# Patient Record
Sex: MALE | Race: WHITE | NOT HISPANIC OR LATINO | Employment: FULL TIME | URBAN - METROPOLITAN AREA
[De-identification: names, ages, dates, MRNs, and addresses within clinical notes are randomized per-mention and may not be internally consistent; named-entity substitution may affect disease eponyms.]

---

## 2023-05-01 DIAGNOSIS — J30.2 SEASONAL ALLERGIC RHINITIS, UNSPECIFIED TRIGGER: Primary | ICD-10-CM

## 2023-05-01 RX ORDER — FLUTICASONE PROPIONATE 50 MCG
2 SPRAY, SUSPENSION (ML) NASAL DAILY
COMMUNITY
End: 2023-05-01 | Stop reason: SDUPTHER

## 2023-05-01 RX ORDER — FLUTICASONE PROPIONATE 50 MCG
2 SPRAY, SUSPENSION (ML) NASAL DAILY
Qty: 16 G | Refills: 11 | Status: SHIPPED | OUTPATIENT
Start: 2023-05-01

## 2024-03-18 ENCOUNTER — OFFICE VISIT (OUTPATIENT)
Dept: PRIMARY CARE | Facility: CLINIC | Age: 24
End: 2024-03-18
Payer: COMMERCIAL

## 2024-03-18 ENCOUNTER — LAB (OUTPATIENT)
Dept: LAB | Facility: LAB | Age: 24
End: 2024-03-18
Payer: COMMERCIAL

## 2024-03-18 VITALS
HEIGHT: 70 IN | OXYGEN SATURATION: 96 % | BODY MASS INDEX: 26.05 KG/M2 | SYSTOLIC BLOOD PRESSURE: 128 MMHG | WEIGHT: 182 LBS | RESPIRATION RATE: 20 BRPM | HEART RATE: 73 BPM | TEMPERATURE: 98.2 F | DIASTOLIC BLOOD PRESSURE: 78 MMHG

## 2024-03-18 DIAGNOSIS — K59.09 CHRONIC CONSTIPATION: ICD-10-CM

## 2024-03-18 DIAGNOSIS — Z00.00 ROUTINE GENERAL MEDICAL EXAMINATION AT A HEALTH CARE FACILITY: ICD-10-CM

## 2024-03-18 DIAGNOSIS — R51.9 NONINTRACTABLE EPISODIC HEADACHE, UNSPECIFIED HEADACHE TYPE: ICD-10-CM

## 2024-03-18 DIAGNOSIS — Z00.00 ROUTINE GENERAL MEDICAL EXAMINATION AT A HEALTH CARE FACILITY: Primary | ICD-10-CM

## 2024-03-18 DIAGNOSIS — H61.23 BILATERAL IMPACTED CERUMEN: ICD-10-CM

## 2024-03-18 PROBLEM — S76.311A HAMSTRING STRAIN, RIGHT, INITIAL ENCOUNTER: Status: RESOLVED | Noted: 2024-03-18 | Resolved: 2024-03-18

## 2024-03-18 PROBLEM — R10.13 EPIGASTRIC ABDOMINAL PAIN: Status: RESOLVED | Noted: 2024-03-18 | Resolved: 2024-03-18

## 2024-03-18 PROBLEM — J06.9 UPPER RESPIRATORY INFECTION: Status: RESOLVED | Noted: 2024-03-18 | Resolved: 2024-03-18

## 2024-03-18 PROBLEM — S30.0XXA CONTUSION OF SACRUM: Status: RESOLVED | Noted: 2024-03-18 | Resolved: 2024-03-18

## 2024-03-18 PROBLEM — L73.9 FOLLICULITIS: Status: RESOLVED | Noted: 2024-03-18 | Resolved: 2024-03-18

## 2024-03-18 PROBLEM — J30.9 ALLERGIC RHINITIS: Status: ACTIVE | Noted: 2024-03-18

## 2024-03-18 PROBLEM — K21.9 GERD (GASTROESOPHAGEAL REFLUX DISEASE): Status: ACTIVE | Noted: 2024-03-18

## 2024-03-18 LAB
ALBUMIN SERPL BCP-MCNC: 4.8 G/DL (ref 3.4–5)
ALP SERPL-CCNC: 60 U/L (ref 33–120)
ALT SERPL W P-5'-P-CCNC: 33 U/L (ref 10–52)
ANION GAP SERPL CALC-SCNC: 11 MMOL/L (ref 10–20)
AST SERPL W P-5'-P-CCNC: 19 U/L (ref 9–39)
BASOPHILS # BLD AUTO: 0.05 X10*3/UL (ref 0–0.1)
BASOPHILS NFR BLD AUTO: 0.8 %
BILIRUB SERPL-MCNC: 0.8 MG/DL (ref 0–1.2)
BUN SERPL-MCNC: 14 MG/DL (ref 6–23)
CALCIUM SERPL-MCNC: 9.5 MG/DL (ref 8.6–10.3)
CHLORIDE SERPL-SCNC: 103 MMOL/L (ref 98–107)
CHOLEST SERPL-MCNC: 166 MG/DL (ref 0–199)
CHOLESTEROL/HDL RATIO: 4.2
CO2 SERPL-SCNC: 27 MMOL/L (ref 21–32)
CREAT SERPL-MCNC: 1.01 MG/DL (ref 0.5–1.3)
EGFRCR SERPLBLD CKD-EPI 2021: >90 ML/MIN/1.73M*2
EOSINOPHIL # BLD AUTO: 0.24 X10*3/UL (ref 0–0.7)
EOSINOPHIL NFR BLD AUTO: 3.7 %
ERYTHROCYTE [DISTWIDTH] IN BLOOD BY AUTOMATED COUNT: 12.4 % (ref 11.5–14.5)
GLUCOSE SERPL-MCNC: 98 MG/DL (ref 74–99)
HCT VFR BLD AUTO: 43.9 % (ref 41–52)
HDLC SERPL-MCNC: 39.1 MG/DL
HGB BLD-MCNC: 15.4 G/DL (ref 13.5–17.5)
IMM GRANULOCYTES # BLD AUTO: 0.01 X10*3/UL (ref 0–0.7)
IMM GRANULOCYTES NFR BLD AUTO: 0.2 % (ref 0–0.9)
LDLC SERPL CALC-MCNC: 104 MG/DL
LYMPHOCYTES # BLD AUTO: 1.62 X10*3/UL (ref 1.2–4.8)
LYMPHOCYTES NFR BLD AUTO: 25.2 %
MCH RBC QN AUTO: 30.6 PG (ref 26–34)
MCHC RBC AUTO-ENTMCNC: 35.1 G/DL (ref 32–36)
MCV RBC AUTO: 87 FL (ref 80–100)
MONOCYTES # BLD AUTO: 0.65 X10*3/UL (ref 0.1–1)
MONOCYTES NFR BLD AUTO: 10.1 %
NEUTROPHILS # BLD AUTO: 3.86 X10*3/UL (ref 1.2–7.7)
NEUTROPHILS NFR BLD AUTO: 60 %
NON HDL CHOLESTEROL: 127 MG/DL (ref 0–149)
NRBC BLD-RTO: 0 /100 WBCS (ref 0–0)
PLATELET # BLD AUTO: 287 X10*3/UL (ref 150–450)
POTASSIUM SERPL-SCNC: 3.8 MMOL/L (ref 3.5–5.3)
PROT SERPL-MCNC: 7.5 G/DL (ref 6.4–8.2)
RBC # BLD AUTO: 5.03 X10*6/UL (ref 4.5–5.9)
SODIUM SERPL-SCNC: 137 MMOL/L (ref 136–145)
TRIGL SERPL-MCNC: 117 MG/DL (ref 0–149)
VLDL: 23 MG/DL (ref 0–40)
WBC # BLD AUTO: 6.4 X10*3/UL (ref 4.4–11.3)

## 2024-03-18 PROCEDURE — 36415 COLL VENOUS BLD VENIPUNCTURE: CPT

## 2024-03-18 PROCEDURE — 99395 PREV VISIT EST AGE 18-39: CPT

## 2024-03-18 PROCEDURE — 80061 LIPID PANEL: CPT

## 2024-03-18 PROCEDURE — 1036F TOBACCO NON-USER: CPT

## 2024-03-18 PROCEDURE — 80053 COMPREHEN METABOLIC PANEL: CPT

## 2024-03-18 PROCEDURE — 85025 COMPLETE CBC W/AUTO DIFF WBC: CPT

## 2024-03-18 RX ORDER — CETIRIZINE HYDROCHLORIDE 10 MG/1
TABLET ORAL EVERY 24 HOURS
COMMUNITY
Start: 2020-03-26

## 2024-03-18 SDOH — ECONOMIC STABILITY: INCOME INSECURITY: IN THE LAST 12 MONTHS, WAS THERE A TIME WHEN YOU WERE NOT ABLE TO PAY THE MORTGAGE OR RENT ON TIME?: NO

## 2024-03-18 SDOH — ECONOMIC STABILITY: FOOD INSECURITY: WITHIN THE PAST 12 MONTHS, YOU WORRIED THAT YOUR FOOD WOULD RUN OUT BEFORE YOU GOT MONEY TO BUY MORE.: NEVER TRUE

## 2024-03-18 SDOH — ECONOMIC STABILITY: FOOD INSECURITY: WITHIN THE PAST 12 MONTHS, THE FOOD YOU BOUGHT JUST DIDN'T LAST AND YOU DIDN'T HAVE MONEY TO GET MORE.: NEVER TRUE

## 2024-03-18 SDOH — ECONOMIC STABILITY: GENERAL
WHICH OF THE FOLLOWING DO YOU KNOW HOW TO USE AND HAVE ACCESS TO EVERY DAY? (CHOOSE ALL THAT APPLY): DESKTOP COMPUTER, LAPTOP COMPUTER, OR TABLET WITH BROADBAND INTERNET CONNECTION;SMARTPHONE WITH CELLULAR DATA PLAN

## 2024-03-18 SDOH — ECONOMIC STABILITY: TRANSPORTATION INSECURITY
IN THE PAST 12 MONTHS, HAS LACK OF TRANSPORTATION KEPT YOU FROM MEETINGS, WORK, OR FROM GETTING THINGS NEEDED FOR DAILY LIVING?: NO

## 2024-03-18 SDOH — HEALTH STABILITY: PHYSICAL HEALTH: ON AVERAGE, HOW MANY DAYS PER WEEK DO YOU ENGAGE IN MODERATE TO STRENUOUS EXERCISE (LIKE A BRISK WALK)?: 5 DAYS

## 2024-03-18 SDOH — HEALTH STABILITY: PHYSICAL HEALTH: ON AVERAGE, HOW MANY MINUTES DO YOU ENGAGE IN EXERCISE AT THIS LEVEL?: 60 MIN

## 2024-03-18 SDOH — ECONOMIC STABILITY: HOUSING INSECURITY
IN THE LAST 12 MONTHS, WAS THERE A TIME WHEN YOU DID NOT HAVE A STEADY PLACE TO SLEEP OR SLEPT IN A SHELTER (INCLUDING NOW)?: NO

## 2024-03-18 SDOH — ECONOMIC STABILITY: HOUSING INSECURITY: IN THE LAST 12 MONTHS, HOW MANY PLACES HAVE YOU LIVED?: 1

## 2024-03-18 SDOH — ECONOMIC STABILITY: TRANSPORTATION INSECURITY
IN THE PAST 12 MONTHS, HAS THE LACK OF TRANSPORTATION KEPT YOU FROM MEDICAL APPOINTMENTS OR FROM GETTING MEDICATIONS?: NO

## 2024-03-18 SDOH — ECONOMIC STABILITY: GENERAL
WHICH OF THE FOLLOWING WOULD YOU LIKE TO GET CONNECTED TO IN ORDER TO RECEIVE A DISCOUNT OR FOR FREE? (CHOOSE ALL THAT APPLY): NONE OF THESE

## 2024-03-18 ASSESSMENT — ENCOUNTER SYMPTOMS
DIFFICULTY URINATING: 0
HEADACHES: 1
CONSTIPATION: 1
NUMBNESS: 0
FEVER: 0
DIZZINESS: 0
ABDOMINAL PAIN: 0
DEPRESSION: 0
PALPITATIONS: 0
SINUS PRESSURE: 0
VOMITING: 0
RECTAL PAIN: 0
LOSS OF SENSATION IN FEET: 0
WEAKNESS: 0
SHORTNESS OF BREATH: 0
CHILLS: 0
LIGHT-HEADEDNESS: 0
FATIGUE: 0
SORE THROAT: 0
CHEST TIGHTNESS: 0
OCCASIONAL FEELINGS OF UNSTEADINESS: 0
DIARRHEA: 0

## 2024-03-18 ASSESSMENT — SOCIAL DETERMINANTS OF HEALTH (SDOH)
ARE YOU MARRIED, WIDOWED, DIVORCED, SEPARATED, NEVER MARRIED, OR LIVING WITH A PARTNER?: NEVER MARRIED
HOW OFTEN DO YOU GET TOGETHER WITH FRIENDS OR RELATIVES?: TWICE A WEEK
HOW OFTEN DO YOU ATTENT MEETINGS OF THE CLUB OR ORGANIZATION YOU BELONG TO?: MORE THAN 4 TIMES PER YEAR
WITHIN THE LAST YEAR, HAVE TO BEEN RAPED OR FORCED TO HAVE ANY KIND OF SEXUAL ACTIVITY BY YOUR PARTNER OR EX-PARTNER?: NO
HOW HARD IS IT FOR YOU TO PAY FOR THE VERY BASICS LIKE FOOD, HOUSING, MEDICAL CARE, AND HEATING?: NOT HARD AT ALL
WITHIN THE LAST YEAR, HAVE YOU BEEN HUMILIATED OR EMOTIONALLY ABUSED IN OTHER WAYS BY YOUR PARTNER OR EX-PARTNER?: NO
HOW OFTEN DO YOU ATTEND CHURCH OR RELIGIOUS SERVICES?: MORE THAN 4 TIMES PER YEAR
IN THE PAST 12 MONTHS, HAS THE ELECTRIC, GAS, OIL, OR WATER COMPANY THREATENED TO SHUT OFF SERVICE IN YOUR HOME?: NO
DO YOU BELONG TO ANY CLUBS OR ORGANIZATIONS SUCH AS CHURCH GROUPS UNIONS, FRATERNAL OR ATHLETIC GROUPS, OR SCHOOL GROUPS?: NO
WITHIN THE LAST YEAR, HAVE YOU BEEN AFRAID OF YOUR PARTNER OR EX-PARTNER?: NO
WITHIN THE LAST YEAR, HAVE YOU BEEN KICKED, HIT, SLAPPED, OR OTHERWISE PHYSICALLY HURT BY YOUR PARTNER OR EX-PARTNER?: NO
IN A TYPICAL WEEK, HOW MANY TIMES DO YOU TALK ON THE PHONE WITH FAMILY, FRIENDS, OR NEIGHBORS?: MORE THAN THREE TIMES A WEEK

## 2024-03-18 ASSESSMENT — LIFESTYLE VARIABLES
AUDIT-C TOTAL SCORE: 1
HOW OFTEN DO YOU HAVE A DRINK CONTAINING ALCOHOL: MONTHLY OR LESS
HOW OFTEN DO YOU HAVE SIX OR MORE DRINKS ON ONE OCCASION: NEVER
SKIP TO QUESTIONS 9-10: 1
HOW MANY STANDARD DRINKS CONTAINING ALCOHOL DO YOU HAVE ON A TYPICAL DAY: 1 OR 2

## 2024-03-18 ASSESSMENT — PATIENT HEALTH QUESTIONNAIRE - PHQ9
SUM OF ALL RESPONSES TO PHQ9 QUESTIONS 1 & 2: 0
1. LITTLE INTEREST OR PLEASURE IN DOING THINGS: NOT AT ALL
2. FEELING DOWN, DEPRESSED OR HOPELESS: NOT AT ALL

## 2024-03-18 NOTE — PROGRESS NOTES
Subjective   Jose F Reece is a 23 y.o. male who is here for a routine exam. Two concerns today, first being migraines in morning from what he suspects is due to allergies.  This has been going on for couple weeks and tends to improve with Tylenol and ibuprofen.  He also works in IT and looks at screens and is not sure if looking at screens is causing his headaches as well.  Headaches tend to be either bilateral at the back of his neck or left side of his head.  These occur in the morning usually.  Is getting them 1-2 times per week.  No other deficits with this.  His other concern is blood on toilet paper with some straining.  Bowel movements are irregular, says he might be having some constipation.  No blood in the toilet just on toilet paper.  Occasionally painful when he notices the blood.      Comprehensive Medical/Surgical/Social/Family History  Past Medical History:   Diagnosis Date    Chondrocostal junction syndrome (tietze) 2016    Costochondritis    Contusion of sacrum 2024    Epigastric abdominal pain 2024    Folliculitis 2024    Hamstring strain, right, initial encounter 2024    Outcome of delivery, unspecified     Twin birth    Personal history of diseases of the skin and subcutaneous tissue 2015    History of acne     , unspecified weeks of gestation     Premature birth    Upper respiratory infection 2024     Past Surgical History:   Procedure Laterality Date    MYRINGOTOMY W/ TUBES  2015    Myringotomy - With Ventilating Tube Insertion     Social History     Social History Narrative    Not on file         Allergies and Medications  Patient has no known allergies.  Current Outpatient Medications on File Prior to Visit   Medication Sig Dispense Refill    cetirizine (ZyrTEC) 10 mg tablet Take by mouth once every 24 hours.      fluticasone (Flonase) 50 mcg/actuation nasal spray Administer 2 sprays into each nostril once daily. 16 g 11  "    No current facility-administered medications on file prior to visit.       New concerns:  Headaches,    Lifestyle  Diet: Varied, vegetables/produce at dinner and sometimes   Exercise: 4x/week one hourat time.  Tobacco: none  Alcohol:  Social drinking, 1-2x/month1-3 drinks.  Stress/Work:  Works in IT.       Review of Systems   Constitutional:  Negative for chills, fatigue and fever.   HENT:  Negative for ear pain, sinus pressure and sore throat.    Respiratory:  Negative for chest tightness and shortness of breath.    Cardiovascular:  Negative for chest pain and palpitations.   Gastrointestinal:  Positive for constipation. Negative for abdominal pain, diarrhea, rectal pain and vomiting.   Genitourinary:  Negative for difficulty urinating.   Neurological:  Positive for headaches. Negative for dizziness, weakness, light-headedness and numbness.       Objective   /78   Pulse 73   Temp 36.8 °C (98.2 °F)   Resp 20   Ht 1.778 m (5' 10\")   Wt 82.6 kg (182 lb)   SpO2 96%   BMI 26.11 kg/m²     Physical Exam  Constitutional:       General: He is not in acute distress.     Appearance: He is not ill-appearing, toxic-appearing or diaphoretic.   HENT:      Right Ear: Hearing and external ear normal. No drainage, swelling or tenderness. There is impacted cerumen.      Left Ear: Hearing and external ear normal. No drainage, swelling or tenderness. There is impacted cerumen.      Nose: Nose normal.      Mouth/Throat:      Mouth: Mucous membranes are moist.      Pharynx: Oropharynx is clear.   Eyes:      Conjunctiva/sclera: Conjunctivae normal.   Cardiovascular:      Rate and Rhythm: Normal rate and regular rhythm.      Pulses: Normal pulses.      Heart sounds: Normal heart sounds.   Pulmonary:      Effort: Pulmonary effort is normal.      Breath sounds: Normal breath sounds.   Skin:     General: Skin is warm and dry.      Capillary Refill: Capillary refill takes less than 2 seconds.   Neurological:      Mental " Status: He is alert.      Gait: Gait normal.   Psychiatric:         Mood and Affect: Mood normal.         Behavior: Behavior normal.       Assessment/Plan   Problem List Items Addressed This Visit    None  Visit Diagnoses       Routine general medical examination at a health care facility    -  Primary    Relevant Orders    Lipid panel    Comprehensive metabolic panel    CBC and Auto Differential    Bilateral impacted cerumen        Relevant Medications    carbamide peroxide (Debrox) 6.5 % otic solution            Reviewed Social Determinants of health with patient, discussed healthy lifestyle including 150 minutes of physical activity per week  Recommended at least 2 days of muscle strengthening activity per week.    Ordered/Reviewed baseline labwork -CBC, CMP, Lipid Panel  Patient has bilateral impacted cerumen.  Will start with Debrox and have patient return if no improvement.  Suspect rectal bleeding is likely due to constipation/hemorrhoids.   Recommended increased fiber for constipation and adding miralax or metamucil. If blood/rectal bleeding continues, will order colonoscopy.   Patient does have occasional headaches that he attributes to allergies.  If patient's not having any improvement in his headaches we will have him follow-up for further workup of headaches pending lab work.  Immunizations Up-to-Date  Return as needed for headaches and/or rectal bleeding if no improvement

## 2024-03-19 NOTE — PROGRESS NOTES
I reviewed the resident/fellow's documentation and discussed the patient with the resident. I agree with the resident medical decision making as documented in the note.   Patient has multiple issues going on at this time.  He is headaches.  Usually around this time.  Open sinus type headaches.  Those are recurring.  He has not started any allergy medications.  No numbness tingling, blood pressure appears fine.  No CVA like symptoms.  He has had these in the past.  Will try different medication and see if it helps if not he will need imaging.  Patient states a few times she has had blood on the toilet paper.  Has had harder stools he strains very hard.  He is can try fiber.  If we see any blood again we will have to see GI.  He is to increase fiber and fluids.  He is to follow-up in 2 weeks.  Again if he continues to have headaches we will get imaging.  He does have a history of this.  If he has any bleeding he will see gastroenterology  Agree with assessment and plan  If any chest pain shortness of breath any nausea vomiting any abdominal pain any headache any vision changes any worsening symptoms go to the ER  Agree with assessment and plan  Lisandro Slade, DO

## 2024-04-01 ENCOUNTER — OFFICE VISIT (OUTPATIENT)
Dept: PRIMARY CARE | Facility: CLINIC | Age: 24
End: 2024-04-01
Payer: COMMERCIAL

## 2024-04-01 VITALS
SYSTOLIC BLOOD PRESSURE: 136 MMHG | TEMPERATURE: 98.3 F | OXYGEN SATURATION: 95 % | WEIGHT: 181 LBS | RESPIRATION RATE: 18 BRPM | HEIGHT: 71 IN | HEART RATE: 87 BPM | BODY MASS INDEX: 25.34 KG/M2 | DIASTOLIC BLOOD PRESSURE: 90 MMHG

## 2024-04-01 DIAGNOSIS — R03.0 ELEVATED BLOOD PRESSURE READING IN OFFICE WITHOUT DIAGNOSIS OF HYPERTENSION: ICD-10-CM

## 2024-04-01 DIAGNOSIS — R07.89 OTHER CHEST PAIN: ICD-10-CM

## 2024-04-01 DIAGNOSIS — S46.812A TRAPEZIUS STRAIN, LEFT, INITIAL ENCOUNTER: Primary | ICD-10-CM

## 2024-04-01 DIAGNOSIS — M25.512 ACUTE PAIN OF LEFT SHOULDER: ICD-10-CM

## 2024-04-01 PROCEDURE — 1036F TOBACCO NON-USER: CPT

## 2024-04-01 PROCEDURE — 99214 OFFICE O/P EST MOD 30 MIN: CPT

## 2024-04-01 PROCEDURE — 93000 ELECTROCARDIOGRAM COMPLETE: CPT | Performed by: FAMILY MEDICINE

## 2024-04-01 RX ORDER — IBUPROFEN 800 MG/1
800 TABLET ORAL EVERY 8 HOURS PRN
Qty: 21 TABLET | Refills: 0 | Status: SHIPPED | OUTPATIENT
Start: 2024-04-01

## 2024-04-01 ASSESSMENT — ENCOUNTER SYMPTOMS
NECK STIFFNESS: 0
LIGHT-HEADEDNESS: 0
WEAKNESS: 0
JOINT SWELLING: 0
BACK PAIN: 0
CHILLS: 0
HEADACHES: 0
FEVER: 0
NECK PAIN: 0
NUMBNESS: 0
ARTHRALGIAS: 1
PALPITATIONS: 0
DIZZINESS: 0

## 2024-04-01 NOTE — PROGRESS NOTES
I saw and evaluated the patient. I personally obtained the key and critical portions of the history and physical exam or was physically present for key and critical portions performed by the resident. I reviewed the resident/fellow's documentation and discussed the patient with the resident/fellow. I agree with the resident/fellow's medical decision making as documented in the note.  Patient has been working out he was doing shoulder extensions with 25 pound weight swing and out.  He does not recall any injury, patient has not had any recent illness, no falls no cough, no fever no chills, no dyspnea, no orthopnea.  Patient states his symptoms come and go with certain movements reproduce it.  It has been getting better.  No leg swelling no blood, like symptoms, on exam patient is very tender tight upper trapezius first rib area, pain with external rotation of the shoulder.  EKG appears similar to prior.  Patient's history and physical exam does appear to point to musculoskeletal disease.  He is can try exercises icing stretches.  If he still having it may need to do an echocardiogram or stress echo.  However it does appear to be very likely muscle skeletal.  However explained to the patient if he starts having numbness tingling any chest pain shortness of breath any dyspnea any leg swelling any fever chills orthopnea go to the ER  Follow-up in 1 to 2 weeks  Agree with assessment plan    Lisandro Slade, DO

## 2024-04-01 NOTE — PROGRESS NOTES
Subjective   Patient ID: Jose F Reece is a 23 y.o. male who presents for Left Shoulder Pain (22 y/o male presents to the office today for left shoulder pain. Pain began about a couple weeks ago, per patient no injury occurred. ).  Keny is presenting with left shoulder pain that began 2-1/2 weeks ago.  He also states that he had some chest pain on the left side of his chest wall 2 weeks ago that is since resolved.  The left-sided chest pain was worse with movement and went from his left shoulder down into the left lateral aspect of his chest worse with movement and palpation not currently present on exam today.  The left shoulder pain he localizes to his upper back/trapezius extending down to the lateral aspect of his shoulder, worse with some movements.  It has been gradually improving over the past 2 weeks with minimal improvement with Advil but over the past several days it is starting to get slightly better.  No stretches or activity changes.  Does have an injury 5 years ago but no acute injuries or traumas to the site.  Does endorse that he was lifting weights a little over 2 weeks ago few days prior to this injury starting with multiple movements and extension flexion of the shoulder joint, dumbbell workouts.  Patient was doing deltoid raises with over 20 pounds as well as some movements that utilized the trapezius and lats.  Patient's had no fevers or chills.  He does have some air and occasional numbness and tingling that started a couple days after the shoulder pain started that goes down into the palmar surface of his hand without affecting his  strength, no swelling in his hand, no rashes.  Numbness  Is been intermittent and has only ever occurred since injuring this left shoulder.  No recent URI or respiratory illness.        Review of Systems   Constitutional:  Negative for chills and fever.   Cardiovascular:  Negative for chest pain and palpitations.   Musculoskeletal:  Positive for  arthralgias. Negative for back pain, joint swelling, neck pain and neck stiffness.   Skin:  Negative for rash.   Neurological:  Negative for dizziness, weakness, light-headedness, numbness and headaches.       Objective   Physical Exam  Constitutional:       General: He is not in acute distress.     Appearance: Normal appearance. He is normal weight. He is not ill-appearing, toxic-appearing or diaphoretic.   HENT:      Nose: Nose normal.      Mouth/Throat:      Mouth: Mucous membranes are moist.      Pharynx: Oropharynx is clear.   Eyes:      Conjunctiva/sclera: Conjunctivae normal.   Cardiovascular:      Rate and Rhythm: Normal rate and regular rhythm.      Pulses: Normal pulses.      Heart sounds: Normal heart sounds.   Pulmonary:      Effort: Pulmonary effort is normal.      Breath sounds: Normal breath sounds.   Musculoskeletal:      Right shoulder: Normal. No swelling, deformity, effusion, laceration, tenderness, bony tenderness or crepitus. Normal range of motion. Normal strength. Normal pulse.      Left shoulder: Tenderness present. No swelling, deformity, effusion, laceration, bony tenderness or crepitus. Normal range of motion. Normal strength. Normal pulse.      Right upper arm: Normal.      Left upper arm: Normal.        Arms:       Cervical back: Normal. No rigidity or tenderness.      Right lower leg: No edema.      Left lower leg: No edema.      Comments: Patient has muscle tender point of the left trapezius, negative drop arm, negative Myers, negative empty can, range of motion intact.  Some muscle hypertonicity of left trapezius.  Point tenderness of trapezius muscle, no point tenderness in anterior or lateral shoulder.   Neurological:      Mental Status: He is alert.         Assessment/Plan   Problem List Items Addressed This Visit    None  Visit Diagnoses         Codes    Trapezius strain, left, initial encounter    -  Primary S46.812A    Relevant Medications    ibuprofen 800 mg tablet    Acute  pain of left shoulder     M25.512    Other chest pain     R07.89    Relevant Orders    ECG 12 lead (Clinic Performed) (Completed)    Elevated blood pressure reading in office without diagnosis of hypertension     R03.0          Omar is presenting to the office with initial complaint of left shoulder pain.  On further examination, patient appears to have left trapezius muscle strain.  Injury began after patient had weightlifting session.  Suspect that this is the likely cause was likely to be a strain that has been gradually improving since.  No acute injury and therefore low suspicion for need of any x-rays.  Patient did endorse some chest pain earlier in the week that he was anxious about that resolved the day after initially presented.  It radiated from his left shoulder down into his lateral chest wall worse with movement, no diaphoresis or short of breath or lightheadedness associated with this.  I suspect that this is most likely a pectoral strain given his description of events and the fact that is resolved.  EKG in the office today is consistent with prior EKG with no ST segment changes suggestive of new ischemia.  Given patient likely has strain gave handout with suggested stretching routine here in office as well as higher dose of ibuprofen with instruction to take it carefully with food.  If patient's not having any improvement in his pain in 2 weeks time we will likely plan for physical therapy and reassess if need for any imaging is needed at that time.         Sheldon Farrell DO 04/01/24 2:20 PM

## 2024-04-24 ENCOUNTER — OFFICE VISIT (OUTPATIENT)
Dept: PRIMARY CARE | Facility: CLINIC | Age: 24
End: 2024-04-24
Payer: COMMERCIAL

## 2024-04-24 VITALS
WEIGHT: 182.5 LBS | BODY MASS INDEX: 25.45 KG/M2 | HEART RATE: 92 BPM | SYSTOLIC BLOOD PRESSURE: 129 MMHG | DIASTOLIC BLOOD PRESSURE: 79 MMHG | OXYGEN SATURATION: 95 % | RESPIRATION RATE: 16 BRPM | TEMPERATURE: 97.4 F

## 2024-04-24 DIAGNOSIS — R22.2 LUMP IN CHEST: Primary | ICD-10-CM

## 2024-04-24 PROCEDURE — 1036F TOBACCO NON-USER: CPT

## 2024-04-24 PROCEDURE — 99213 OFFICE O/P EST LOW 20 MIN: CPT

## 2024-04-24 NOTE — PROGRESS NOTES
Subjective   Patient ID: Jose F Reece is a 23 y.o. male who presents for Breast Mass (Pt here today for lump to left area of chest. Pt stated that it is painful to touch.).  Painful, rednes on left chest wall, can feel a bump, thinks it has gotten smaller, bump has gotten small.     Patient is presenting where he found some erythema on the side of his chest about his left nipple and then noticed a painful/tender lump underneath the skin.  Is gotten smaller since it first progressed and started to feel better.  No poor or pimple or sore or drainage or exudate that he is noticed.  No history of malignancy in himself or family members.  Never noticed this before.  Not sure of any irritation, states that it could have been irritated by being bumped while lifting weights but unsure.    He does state that he is recently been having some localized reactions on his torso that he describes as red bumps which he is attributed to either a new detergent or clothes or possibly NSAIDs.  He describes these as itchy and they resolved with Benadryl.  He has since changed detergent and close and stopped NSAIDs but his morning went on for a few days so he is unsure of the correlation between these bumps or the lump on his chest.        Review of Systems    Objective   Physical Exam  Musculoskeletal:         General: Tenderness present. No swelling, deformity or signs of injury.   Skin:     General: Skin is warm and dry.      Coloration: Skin is not pale.      Findings: Erythema present. No abrasion, abscess, acne, lesion, petechiae, rash or wound.      Comments: Scant amount of erythema lateral to the left areola on his chest.  No fluctuance.  No macules or papules.  No pores or sores or comedones.  No folliculitis noted.  There is a very small possibly centimeter sized, well-circumscribed, smooth with, mobile, tender mass.         Assessment/Plan   Problem List Items Addressed This Visit    None  Visit Diagnoses          Codes    Lump in chest    -  Primary R22.2          Patient is presenting today for a lump on his left chest wall.  As described above it is well-circumscribed, tender, smooth, mobile.  Slight erythema overlying it on his chest wall.  Exam consistent with inflamed breast tissue, consider possible small lipoma or cyst but without for her drainage feels this is less likely.  No fluctuance or fluid and this concern for abscess is low.  Ultrasound in office does not show abscess but possibly edematous tissue.  Discussed with patient possible causes, likely benign inflamed tissue that should improve as it has been for the past day.  Discussed to follow-up if no improvement or worsening again for further workup.       Sheldon Farrell DO 04/24/24 10:06 AM

## 2024-04-26 NOTE — PROGRESS NOTES
I saw and evaluated the patient. I personally obtained the key and critical portions of the history and physical exam or was physically present for key and critical portions performed by the resident/fellow. I reviewed the resident/fellow's documentation and discussed the patient with the resident/fellow. I agree with the resident/fellow's medical decision making as documented in the note.    Sergio Rogers, DO

## 2024-07-03 DIAGNOSIS — J30.2 SEASONAL ALLERGIC RHINITIS, UNSPECIFIED TRIGGER: ICD-10-CM

## 2024-07-03 RX ORDER — FLUTICASONE PROPIONATE 50 MCG
2 SPRAY, SUSPENSION (ML) NASAL DAILY
Qty: 16 G | Refills: 11 | Status: SHIPPED | OUTPATIENT
Start: 2024-07-03

## 2024-08-19 ENCOUNTER — OFFICE VISIT (OUTPATIENT)
Dept: PRIMARY CARE | Facility: CLINIC | Age: 24
End: 2024-08-19
Payer: COMMERCIAL

## 2024-08-19 VITALS
WEIGHT: 186 LBS | HEIGHT: 71 IN | RESPIRATION RATE: 20 BRPM | TEMPERATURE: 98.2 F | OXYGEN SATURATION: 97 % | SYSTOLIC BLOOD PRESSURE: 167 MMHG | DIASTOLIC BLOOD PRESSURE: 83 MMHG | HEART RATE: 94 BPM | BODY MASS INDEX: 26.04 KG/M2

## 2024-08-19 DIAGNOSIS — L29.8 PRURITUS OF GROIN IN MALE: ICD-10-CM

## 2024-08-19 DIAGNOSIS — Z11.3 ROUTINE SCREENING FOR STI (SEXUALLY TRANSMITTED INFECTION): Primary | ICD-10-CM

## 2024-08-19 LAB
POC APPEARANCE, URINE: CLEAR
POC BILIRUBIN, URINE: NEGATIVE
POC BLOOD, URINE: NEGATIVE
POC COLOR, URINE: YELLOW
POC GLUCOSE, URINE: NEGATIVE MG/DL
POC KETONES, URINE: NEGATIVE MG/DL
POC LEUKOCYTES, URINE: NEGATIVE
POC NITRITE,URINE: NEGATIVE
POC PH, URINE: 6.5 PH
POC PROTEIN, URINE: NEGATIVE MG/DL
POC SPECIFIC GRAVITY, URINE: 1.01
POC UROBILINOGEN, URINE: 0.2 EU/DL

## 2024-08-19 PROCEDURE — 87491 CHLMYD TRACH DNA AMP PROBE: CPT

## 2024-08-19 PROCEDURE — 87591 N.GONORRHOEAE DNA AMP PROB: CPT

## 2024-08-19 PROCEDURE — 99213 OFFICE O/P EST LOW 20 MIN: CPT

## 2024-08-19 PROCEDURE — 81002 URINALYSIS NONAUTO W/O SCOPE: CPT

## 2024-08-19 PROCEDURE — 3008F BODY MASS INDEX DOCD: CPT

## 2024-08-19 PROCEDURE — 1036F TOBACCO NON-USER: CPT

## 2024-08-19 RX ORDER — NYSTATIN 100000 [USP'U]/G
1 POWDER TOPICAL 2 TIMES DAILY
Qty: 30 G | Refills: 11 | Status: SHIPPED | OUTPATIENT
Start: 2024-08-19 | End: 2025-08-19

## 2024-08-19 NOTE — PROGRESS NOTES
I reviewed the resident/fellow's documentation and discussed the patient with the resident. I agree with the resident medical decision making as documented in the note.   Patient has not had any fever no chills, no redness no months.  No pyoderma gangrenosum like symptoms.  Patient's hygiene has been fine.  He just feels like it has been itching more.  Little different when he urinates.  No prostate like symptoms.  No fever no chills no redness, will treat him as possible irritation.  Check his urine.  However if patient starts noticing any redness any swelling any pain any actual lesions in the area notify the office to go to the ER  Follow-up in 1 week  Side effects of medication complaint to the patient  Agree with assessment plan  Lisandro Slade, DO

## 2024-08-19 NOTE — PROGRESS NOTES
"Subjective   Jose F Reece is a 24 y.o. male who presents for Burning behind scrotum (Burning behind scrotum since Tuesday. No known Rash.).    Having irritation, burning, peeing since Tuesday. Has had jock itch before. Recently started wearing looser boxers. Has not noticed a rash or discharge. No burning with urination.     Has tried over the counter spray.    Not currently sexually in the past month. No concern for STD exposure.  Will discharge or genital lesions.    Visit Vitals  /83 (BP Location: Right arm, Patient Position: Sitting)   Pulse 94   Temp 36.8 °C (98.2 °F)   Resp 20      Objective   Physical Exam  Exam conducted with a chaperone present.   Genitourinary:     Pubic Area: No rash or pubic lice.       Penis: Normal. No erythema, tenderness, discharge or lesions.       Testes: Normal.            Assessment/Plan      Assessment & Plan  Routine screening for STI (sexually transmitted infection)  Patient reports itching in groin as well as \"change in how urination feels\".  No blood in the urine or pain with urination.  Abundance of caution this was ordered as well as gonorrhea chlamydia.  Patient is sexually active in the last month with 1 partner.  No signs or symptoms of STD on examination.  Orders:    POCT UA (nonautomated) manually resulted    C. Trachomatis / N. Gonorrhoeae, Amplified Detection; Future    Pruritus of groin in male  With concern for itching in his groin that started last Tuesday.  He recently started wearing a boxers and is concerned that he may have developed \"jock itch\".  On examination there is normal-appearing skin with no apparent tinea cruris or other skin infection.  There is no genital lesions or discharge or ingrown hairs.  I discussed with him that may be sweat accumulation and friction rub caused by the looser fitting boxers, however I have sent in nystatin powder to be used if rash does develop.  Recommend good hygiene such as showering immediately after " "exercise and drying the area completely before putting on underwear.  Orders:    nystatin (Mycostatin) 100,000 unit/gram powder; Apply 1 Application topically 2 times a day.           This note was partially created using voice recognition software and is inherently subject to errors including those of syntax and \"sound-alike\" substitutions which may escape proofreading. In such instances, original meaning may be extrapolated by contextual derivation.      "

## 2024-08-20 LAB
C TRACH RRNA SPEC QL NAA+PROBE: NEGATIVE
N GONORRHOEA DNA SPEC QL PROBE+SIG AMP: NEGATIVE

## 2024-10-28 ENCOUNTER — APPOINTMENT (OUTPATIENT)
Dept: PRIMARY CARE | Facility: CLINIC | Age: 24
End: 2024-10-28
Payer: COMMERCIAL

## 2024-10-31 ENCOUNTER — OFFICE VISIT (OUTPATIENT)
Dept: PRIMARY CARE | Facility: CLINIC | Age: 24
End: 2024-10-31
Payer: COMMERCIAL

## 2024-10-31 ENCOUNTER — HOSPITAL ENCOUNTER (OUTPATIENT)
Dept: RADIOLOGY | Facility: CLINIC | Age: 24
Discharge: HOME | End: 2024-10-31
Payer: COMMERCIAL

## 2024-10-31 VITALS
OXYGEN SATURATION: 95 % | SYSTOLIC BLOOD PRESSURE: 129 MMHG | HEART RATE: 77 BPM | TEMPERATURE: 98.4 F | BODY MASS INDEX: 25.44 KG/M2 | WEIGHT: 182.38 LBS | RESPIRATION RATE: 16 BRPM | DIASTOLIC BLOOD PRESSURE: 77 MMHG

## 2024-10-31 DIAGNOSIS — G54.0 THORACIC OUTLET SYNDROME: ICD-10-CM

## 2024-10-31 DIAGNOSIS — M54.9 UPPER BACK PAIN ON LEFT SIDE: ICD-10-CM

## 2024-10-31 DIAGNOSIS — M54.2 NECK PAIN: ICD-10-CM

## 2024-10-31 DIAGNOSIS — M54.9 UPPER BACK PAIN ON LEFT SIDE: Primary | ICD-10-CM

## 2024-10-31 PROCEDURE — 72040 X-RAY EXAM NECK SPINE 2-3 VW: CPT

## 2024-10-31 RX ORDER — DICLOFENAC SODIUM 10 MG/G
4 GEL TOPICAL 4 TIMES DAILY
Qty: 100 G | Refills: 1 | Status: SHIPPED | OUTPATIENT
Start: 2024-10-31

## 2024-10-31 RX ORDER — CYCLOBENZAPRINE HCL 5 MG
5 TABLET ORAL 3 TIMES DAILY PRN
Qty: 30 TABLET | Refills: 0 | Status: SHIPPED | OUTPATIENT
Start: 2024-10-31 | End: 2024-11-14

## 2024-10-31 ASSESSMENT — ENCOUNTER SYMPTOMS
HEADACHES: 0
FEVER: 0
NECK STIFFNESS: 0
NECK PAIN: 1
NUMBNESS: 1
WEAKNESS: 0
LIGHT-HEADEDNESS: 0
MYALGIAS: 1
BACK PAIN: 1

## 2024-11-18 ENCOUNTER — EVALUATION (OUTPATIENT)
Dept: PHYSICAL THERAPY | Facility: CLINIC | Age: 24
End: 2024-11-18
Payer: COMMERCIAL

## 2024-11-18 DIAGNOSIS — G54.0 THORACIC OUTLET SYNDROME: ICD-10-CM

## 2024-11-18 DIAGNOSIS — M54.9 UPPER BACK PAIN ON LEFT SIDE: ICD-10-CM

## 2024-11-18 DIAGNOSIS — M54.2 NECK PAIN: ICD-10-CM

## 2024-11-18 PROCEDURE — 97110 THERAPEUTIC EXERCISES: CPT | Mod: GP

## 2024-11-18 PROCEDURE — 97014 ELECTRIC STIMULATION THERAPY: CPT | Mod: GP

## 2024-11-18 PROCEDURE — 97161 PT EVAL LOW COMPLEX 20 MIN: CPT | Mod: GP

## 2024-11-18 ASSESSMENT — PATIENT HEALTH QUESTIONNAIRE - PHQ9
SUM OF ALL RESPONSES TO PHQ9 QUESTIONS 1 AND 2: 0
1. LITTLE INTEREST OR PLEASURE IN DOING THINGS: NOT AT ALL
2. FEELING DOWN, DEPRESSED OR HOPELESS: NOT AT ALL

## 2024-11-18 ASSESSMENT — COLUMBIA-SUICIDE SEVERITY RATING SCALE - C-SSRS
2. HAVE YOU ACTUALLY HAD ANY THOUGHTS OF KILLING YOURSELF?: NO
6. HAVE YOU EVER DONE ANYTHING, STARTED TO DO ANYTHING, OR PREPARED TO DO ANYTHING TO END YOUR LIFE?: NO
1. IN THE PAST MONTH, HAVE YOU WISHED YOU WERE DEAD OR WISHED YOU COULD GO TO SLEEP AND NOT WAKE UP?: NO
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
## 2024-12-05 ENCOUNTER — TREATMENT (OUTPATIENT)
Dept: PHYSICAL THERAPY | Facility: CLINIC | Age: 24
End: 2024-12-05
Payer: COMMERCIAL

## 2024-12-05 DIAGNOSIS — M54.2 NECK PAIN: ICD-10-CM

## 2024-12-05 DIAGNOSIS — G54.0 THORACIC OUTLET SYNDROME: ICD-10-CM

## 2024-12-05 DIAGNOSIS — M54.9 UPPER BACK PAIN ON LEFT SIDE: Primary | ICD-10-CM

## 2024-12-05 PROCEDURE — 97014 ELECTRIC STIMULATION THERAPY: CPT | Mod: GP

## 2024-12-05 PROCEDURE — 97140 MANUAL THERAPY 1/> REGIONS: CPT | Mod: GP

## 2024-12-05 PROCEDURE — 97110 THERAPEUTIC EXERCISES: CPT | Mod: GP

## 2024-12-05 NOTE — PROGRESS NOTES
Physical Therapy Treatment Note      Patient Name Jose F Reece  MRN: [id number]  Today's Date: 12/5/2024  Time Calculation  Start Time: 0830  Stop Time: 0915  Time Calculation (min): 45 min    Insurance: Payor: MED[facility] / Plan: [employer] / Product Type: *No Product type* /  EVAL $179.92 // 40 PT/OT V PCY 0 USED NO AUTH NEEDED PAYS % AFTER DED 5000.00 405.85 APPLIED   Visit #: 2  Date of Onset: 10/1/2024   -authorization required: no    Problem List Items Addressed This Visit             ICD-10-CM    Upper back pain on left side - Primary M54.9    Thoracic outlet syndrome G54.0    Neck pain M54.2       General:  Referred by: Janny Arizmendi MD   Next MD appt: none    Precautions: no fall risk    Subjective:  General:  Last 2 days have been painful  1 episode of numbness in hand since evaluation    Functional Progress:    Compliant with HEP:  yes    Understanding of HEP: WNL    Pain  Pain assessment 0-10  Pain score: 7  Pain location: L upper trap/cervical    Objective:  Therapeutic Exercise  15 minutes  see below  **indicates new exercises or progression  NP=not performed    Neuromuscular Re-education:    minutes  See below  **indicates new exercises or progression  NP=not performed    Manual Therapy:    STM L upper trap/cervical seated  Manual cervical traction  15 minutes    Modalities   Cold Pack                          minutes    Electric Stimulation  IFC L upper trap seated int 5 x 15'    15 minutes    Ultrasound    minutes    Other     Exercise Log:  L upper trap stretch 10 sec 5x  L levator stretch 10 sec 5x  Thoracic stretch 10 sec 5x  Cervical retraction 10x **  Scapular retraction 10x **    Education:  Instructed in progression of exercises  Discusses positioning of computer monitor at work     Assessment:  skilled intervention: exercise progression for posture  Manual/modalities for soft tissue  restrictions    Patient would continue to benefit from skilled PT to address remaining functional, objective and subjective deficits to allow them to return to full independence with ADLs     Progressed with postural correction exercises and initiated manual for soft tissue restrictions with large spam noted in L upper trap    Plan:  Serratus wall slides   Wall clocks   Cont manual

## 2024-12-12 ENCOUNTER — APPOINTMENT (OUTPATIENT)
Dept: PHYSICAL THERAPY | Facility: CLINIC | Age: 24
End: 2024-12-12
Payer: COMMERCIAL

## 2024-12-12 ENCOUNTER — DOCUMENTATION (OUTPATIENT)
Dept: PHYSICAL THERAPY | Facility: CLINIC | Age: 24
End: 2024-12-12
Payer: COMMERCIAL

## 2024-12-12 NOTE — PROGRESS NOTES
Physical Therapy                 Therapy Communication Note    Patient Name: Jose F Reece  MRN: [id number]  Department:   Room: Room/bed info not found  Today's Date: 12/12/2024     Discipline: Physical Therapy    Missed Visit Reason:      Missed Time: Cancel    Comment: no reason

## 2024-12-17 ENCOUNTER — APPOINTMENT (OUTPATIENT)
Dept: PHYSICAL THERAPY | Facility: CLINIC | Age: 24
End: 2024-12-17
Payer: COMMERCIAL

## 2024-12-17 ENCOUNTER — DOCUMENTATION (OUTPATIENT)
Dept: PHYSICAL THERAPY | Facility: CLINIC | Age: 24
End: 2024-12-17
Payer: COMMERCIAL

## 2024-12-17 NOTE — PROGRESS NOTES
Physical Therapy                 Therapy Communication Note    Patient Name: Jose F Reece  MRN: [id number]  Department:   Room: Room/bed info not found  Today's Date: 12/17/2024     Discipline: Physical Therapy          Missed Visit Reason:  via my chart    Missed Time: Cancel    Comment:

## 2024-12-18 ENCOUNTER — E-VISIT (OUTPATIENT)
Dept: PRIMARY CARE | Facility: CLINIC | Age: 24
End: 2024-12-18
Payer: COMMERCIAL

## 2024-12-18 DIAGNOSIS — J01.90 ACUTE NON-RECURRENT SINUSITIS, UNSPECIFIED LOCATION: Primary | ICD-10-CM

## 2024-12-18 RX ORDER — AMOXICILLIN 875 MG/1
875 TABLET, FILM COATED ORAL 2 TIMES DAILY
Qty: 14 TABLET | Refills: 0 | Status: SHIPPED | OUTPATIENT
Start: 2024-12-18 | End: 2024-12-25

## 2024-12-18 ASSESSMENT — LIFESTYLE VARIABLES: HISTORY_OF_SMOKING: I HAVE NEVER SMOKED

## 2024-12-18 NOTE — TELEPHONE ENCOUNTER
On Demand Virtual Visit Patient Consent     This visit was completed via electronic form. All issues as below were discussed and addressed but no physical exam was performed. If it was felt that the patient should be evaluated in clinic than they were directed there.     Sinus Pain: Patient complains of congestion, facial pain, nasal congestion, and sinus pressure. Symptoms include congestion, facial pain, and sinus pressure with no fever, chills, night sweats or weight loss. Onset of symptoms was 1 week ago, gradually worsening since that time. He is drinking plenty of fluids.  Past history is significant for no history of pneumonia or bronchitis. Patient is non-smoker    Jose F was seen today for sinus problem.  Diagnoses and all orders for this visit:  Acute non-recurrent sinusitis, unspecified location  -     amoxicillin (Amoxil) 875 mg tablet; Take 1 tablet (875 mg) by mouth 2 times a day for 7 days.

## 2024-12-24 ENCOUNTER — APPOINTMENT (OUTPATIENT)
Dept: PHYSICAL THERAPY | Facility: CLINIC | Age: 24
End: 2024-12-24
Payer: COMMERCIAL

## 2024-12-30 ENCOUNTER — TREATMENT (OUTPATIENT)
Dept: PHYSICAL THERAPY | Facility: CLINIC | Age: 24
End: 2024-12-30
Payer: COMMERCIAL

## 2024-12-30 DIAGNOSIS — M54.2 NECK PAIN: ICD-10-CM

## 2024-12-30 DIAGNOSIS — M54.9 UPPER BACK PAIN ON LEFT SIDE: Primary | ICD-10-CM

## 2024-12-30 DIAGNOSIS — G54.0 THORACIC OUTLET SYNDROME: ICD-10-CM

## 2024-12-30 PROCEDURE — 97014 ELECTRIC STIMULATION THERAPY: CPT | Mod: GP

## 2024-12-30 PROCEDURE — 97140 MANUAL THERAPY 1/> REGIONS: CPT | Mod: GP

## 2024-12-30 PROCEDURE — 97110 THERAPEUTIC EXERCISES: CPT | Mod: GP

## 2024-12-30 NOTE — PROGRESS NOTES
Physical Therapy Treatment Note      Patient Name Jose F Reece  MRN: [id number]  Today's Date: 12/30/2024  Time Calculation  Start Time: 1600  Stop Time: 1640  Time Calculation (min): 40 min    Insurance: Payor: MEDICAL [facility] / Plan: [employer] / Product Type: *No Product type* /  EVAL $179.92 // 40 PT/OT V PCY 0 USED NO AUTH NEEDED PAYS % AFTER DED 5000.00 405.85 APPLIED   Visit #: 3  Date of Onset: 10/1/2024   -authorization required: no    Problem List Items Addressed This Visit             ICD-10-CM    Upper back pain on left side - Primary M54.9    Thoracic outlet syndrome G54.0    Neck pain M54.2       General:  Referred by: Janny Arizmendi MD   Next MD appt: none    Precautions: no fall risk    Subjective:  General:  Has not been very active but pain is dec  Got relief from IFC last session    Functional Progress:    Compliant with HEP:  yes    Understanding of HEP: WNL    Pain  Pain assessment 0-10  Pain score: 4  Pain location: L upper trap/cervical    Objective:  Therapeutic Exercise  15 minutes  see below  **indicates new exercises or progression  NP=not performed    Neuromuscular Re-education:    minutes  See below  **indicates new exercises or progression  NP=not performed    Manual Therapy:    STM L upper trap/cervical seated   10 minutes    Modalities  Electric Stimulation  IFC L upper trap seated int 5 x 15'    15 minutes      Other   cervical ROM  %  %  RSB  75%  LSB 75%  RR  100%  %    Exercise Log:  L upper trap stretch 10 sec 5x  L levator stretch 10 sec 5x  Thoracic stretch 10 sec 5x  Cervical retraction 10x   Scapular retraction 10x     Wall clocks red 10x2 **  Serratus wall slides red 10x2**  Mid rows black 10x2 **  ER stabilization red 10x2**    Education:  Instructed in progression of exercises and issued written instructions/tbands    Assessment:  skilled  intervention: exercise progression for scapular stability    Patient would continue to benefit from skilled PT to address remaining functional, objective and subjective deficits to allow them to return to full independence with ADLs     Plan:  Cont 1x wk with manual/IFC  T/Y

## 2025-01-23 ENCOUNTER — DOCUMENTATION (OUTPATIENT)
Dept: PHYSICAL THERAPY | Facility: CLINIC | Age: 25
End: 2025-01-23
Payer: COMMERCIAL

## 2025-01-23 NOTE — PROGRESS NOTES
Physical Therapy                 Therapy Communication Note    Patient Name: Jose F Reece  MRN: [id number]  Department:   Room: Room/bed info not found  Today's Date: 1/23/2025     Discipline: Physical Therapy          Missed Visit Reason:      Missed Time: Cancel    Comment: pt cancelled last scheduled appt with no further appts scheduled. He will be discharged with no final reasmt available for report period 11/18/24-12/30/24